# Patient Record
Sex: FEMALE | Race: WHITE | NOT HISPANIC OR LATINO
[De-identification: names, ages, dates, MRNs, and addresses within clinical notes are randomized per-mention and may not be internally consistent; named-entity substitution may affect disease eponyms.]

---

## 2017-07-21 ENCOUNTER — RX RENEWAL (OUTPATIENT)
Age: 52
End: 2017-07-21

## 2017-08-10 ENCOUNTER — APPOINTMENT (OUTPATIENT)
Dept: NEUROLOGY | Facility: CLINIC | Age: 52
End: 2017-08-10
Payer: COMMERCIAL

## 2017-08-10 VITALS
WEIGHT: 138 LBS | DIASTOLIC BLOOD PRESSURE: 60 MMHG | HEIGHT: 63 IN | OXYGEN SATURATION: 98 % | SYSTOLIC BLOOD PRESSURE: 102 MMHG | HEART RATE: 73 BPM | BODY MASS INDEX: 24.45 KG/M2

## 2017-08-10 DIAGNOSIS — G40.802 OTHER EPILEPSY, NOT INTRACTABLE, W/OUT STATUS EPILEPTICUS: ICD-10-CM

## 2017-08-10 PROCEDURE — 95816 EEG AWAKE AND DROWSY: CPT

## 2017-08-10 PROCEDURE — 99214 OFFICE O/P EST MOD 30 MIN: CPT

## 2017-08-12 ENCOUNTER — TRANSCRIPTION ENCOUNTER (OUTPATIENT)
Age: 52
End: 2017-08-12

## 2017-11-13 ENCOUNTER — APPOINTMENT (OUTPATIENT)
Dept: OPHTHALMOLOGY | Facility: CLINIC | Age: 52
End: 2017-11-13
Payer: COMMERCIAL

## 2017-11-13 ENCOUNTER — APPOINTMENT (OUTPATIENT)
Dept: OPHTHALMOLOGY | Facility: CLINIC | Age: 52
End: 2017-11-13

## 2017-11-13 DIAGNOSIS — H35.453 SECONDARY PIGMENTARY DEGENERATION, BILATERAL: ICD-10-CM

## 2017-11-13 PROCEDURE — 92014 COMPRE OPH EXAM EST PT 1/>: CPT

## 2017-11-13 RX ORDER — OLOPATADINE HYDROCHLORIDE 2 MG/ML
0.2 SOLUTION OPHTHALMIC DAILY
Qty: 1 | Refills: 11 | Status: ACTIVE | COMMUNITY
Start: 2017-11-13 | End: 1900-01-01

## 2018-02-13 ENCOUNTER — APPOINTMENT (OUTPATIENT)
Dept: NEUROLOGY | Facility: CLINIC | Age: 53
End: 2018-02-13
Payer: COMMERCIAL

## 2018-02-13 VITALS
BODY MASS INDEX: 24.8 KG/M2 | DIASTOLIC BLOOD PRESSURE: 68 MMHG | OXYGEN SATURATION: 99 % | SYSTOLIC BLOOD PRESSURE: 110 MMHG | HEIGHT: 63 IN | WEIGHT: 140 LBS | HEART RATE: 69 BPM | TEMPERATURE: 97.7 F

## 2018-02-13 DIAGNOSIS — G43.909 MIGRAINE, UNSPECIFIED, NOT INTRACTABLE, W/OUT STATUS MIGRAINOSUS: ICD-10-CM

## 2018-02-13 PROCEDURE — 99214 OFFICE O/P EST MOD 30 MIN: CPT

## 2018-02-13 RX ORDER — NAPROXEN 500 MG/1
500 TABLET ORAL
Refills: 0 | Status: ACTIVE | COMMUNITY

## 2018-03-21 ENCOUNTER — RX RENEWAL (OUTPATIENT)
Age: 53
End: 2018-03-21

## 2018-04-03 ENCOUNTER — RX RENEWAL (OUTPATIENT)
Age: 53
End: 2018-04-03

## 2018-11-12 ENCOUNTER — APPOINTMENT (OUTPATIENT)
Dept: OPHTHALMOLOGY | Facility: CLINIC | Age: 53
End: 2018-11-12
Payer: COMMERCIAL

## 2018-11-12 DIAGNOSIS — H10.13 ACUTE ATOPIC CONJUNCTIVITIS, BILATERAL: ICD-10-CM

## 2018-11-12 DIAGNOSIS — H04.123 DRY EYE SYNDROME OF BILATERAL LACRIMAL GLANDS: ICD-10-CM

## 2018-11-12 PROCEDURE — 92014 COMPRE OPH EXAM EST PT 1/>: CPT

## 2018-11-12 RX ORDER — OLOPATADINE HYDROCHLORIDE 2 MG/ML
0.2 SOLUTION OPHTHALMIC DAILY
Qty: 1 | Refills: 5 | Status: ACTIVE | COMMUNITY
Start: 2018-11-12 | End: 1900-01-01

## 2018-11-15 ENCOUNTER — MEDICATION RENEWAL (OUTPATIENT)
Age: 53
End: 2018-11-15

## 2018-11-15 RX ORDER — OLOPATADINE HCL 1 MG/ML
0.1 SOLUTION/ DROPS OPHTHALMIC DAILY
Qty: 1 | Refills: 4 | Status: ACTIVE | COMMUNITY
Start: 2018-11-15 | End: 1900-01-01

## 2019-04-08 ENCOUNTER — RX RENEWAL (OUTPATIENT)
Age: 54
End: 2019-04-08

## 2019-05-07 ENCOUNTER — RX RENEWAL (OUTPATIENT)
Age: 54
End: 2019-05-07

## 2019-09-24 ENCOUNTER — TRANSCRIPTION ENCOUNTER (OUTPATIENT)
Age: 54
End: 2019-09-24

## 2019-11-01 ENCOUNTER — TRANSCRIPTION ENCOUNTER (OUTPATIENT)
Age: 54
End: 2019-11-01

## 2019-11-01 ENCOUNTER — RX RENEWAL (OUTPATIENT)
Age: 54
End: 2019-11-01

## 2019-11-25 ENCOUNTER — APPOINTMENT (OUTPATIENT)
Dept: OPHTHALMOLOGY | Facility: CLINIC | Age: 54
End: 2019-11-25
Payer: COMMERCIAL

## 2019-11-25 ENCOUNTER — NON-APPOINTMENT (OUTPATIENT)
Age: 54
End: 2019-11-25

## 2019-11-25 PROCEDURE — 92014 COMPRE OPH EXAM EST PT 1/>: CPT

## 2020-01-02 ENCOUNTER — TRANSCRIPTION ENCOUNTER (OUTPATIENT)
Age: 55
End: 2020-01-02

## 2020-01-02 ENCOUNTER — RX RENEWAL (OUTPATIENT)
Age: 55
End: 2020-01-02

## 2020-01-07 ENCOUNTER — TRANSCRIPTION ENCOUNTER (OUTPATIENT)
Age: 55
End: 2020-01-07

## 2020-01-21 ENCOUNTER — APPOINTMENT (OUTPATIENT)
Dept: NEUROLOGY | Facility: CLINIC | Age: 55
End: 2020-01-21
Payer: COMMERCIAL

## 2020-01-21 VITALS
HEIGHT: 63 IN | OXYGEN SATURATION: 98 % | BODY MASS INDEX: 26.05 KG/M2 | DIASTOLIC BLOOD PRESSURE: 68 MMHG | HEART RATE: 74 BPM | SYSTOLIC BLOOD PRESSURE: 113 MMHG | WEIGHT: 147 LBS | TEMPERATURE: 98.1 F

## 2020-01-21 PROCEDURE — 99214 OFFICE O/P EST MOD 30 MIN: CPT

## 2020-01-21 NOTE — PHYSICAL EXAM
[General Appearance - Alert] : alert [General Appearance - In No Acute Distress] : in no acute distress [Oriented To Time, Place, And Person] : oriented to person, place, and time [Impaired Insight] : insight and judgment were intact [Affect] : the affect was normal [Person] : oriented to person [Place] : oriented to place [Time] : oriented to time [Visual Intact] : visual attention was ~T not ~L decreased [Concentration Intact] : normal concentrating ability [Repeating Phrases] : no difficulty repeating a phrase [Writing A Sentence] : no difficulty writing a sentence [Naming Objects] : no difficulty naming common objects [Reading] : reading intact [Comprehension] : comprehension intact [Fluency] : fluency intact [Cranial Nerves Optic (II)] : visual acuity intact bilaterally,  visual fields full to confrontation, pupils equal round and reactive to light [Past History] : adequate knowledge of personal past history [Cranial Nerves Facial (VII)] : face symmetrical [Cranial Nerves Trigeminal (V)] : facial sensation intact symmetrically [Cranial Nerves Oculomotor (III)] : extraocular motion intact [Cranial Nerves Vestibulocochlear (VIII)] : hearing was intact bilaterally [Cranial Nerves Accessory (XI - Cranial And Spinal)] : head turning and shoulder shrug symmetric [Cranial Nerves Glossopharyngeal (IX)] : tongue and palate midline [Motor Strength] : muscle strength was normal in all four extremities [Motor Tone] : muscle tone was normal in all four extremities [Cranial Nerves Hypoglossal (XII)] : there was no tongue deviation with protrusion [Sensation Tactile Decrease] : light touch was intact [No Muscle Atrophy] : normal bulk in all four extremities [Abnormal Walk] : normal gait [Balance] : balance was intact [2+] : Ankle jerk left 2+ [Past-pointing] : there was no past-pointing [Tremor] : no tremor present [Plantar Reflex Right Only] : normal on the right [Plantar Reflex Left Only] : normal on the left

## 2020-01-21 NOTE — HISTORY OF PRESENT ILLNESS
[FreeTextEntry1] : Ms. CONN presents today for a follow up visit of Epilepsy. First GTC was in her 20's, REEG from 08/2017 was stable.\par \par At this time she denies any seizure, seizure-like activity, LOC, lapses in time, tongue biting, staring spells or urinary incontinence. Remains compliant with Keppra and TPX without known side effects.\par \par Of note, TPX was started for migraines. Have been stable for many years and typically occur with changes in the weather or stress. \par \par She denies any new neurological complaints at this time.

## 2020-01-21 NOTE — DISCUSSION/SUMMARY
[Well-controlled] : well-controlled [Safety Recommendations] : The patient was advised in regards to the risk of seizures and general seizure safety recommendations including not to be bathing alone, climbing to high places and operating heavy machinery. [Compliance with Medications] : The importance of compliance with medications was reinforced. [Medication Side Effects] : High frequency and serious potential medication adverse effects were reviewed with the patient, including but not exclusive to psychiatric effects.  Information sheets on medication side effects were made available to the patient in our clinic.  The patient or advocate agrees to notify us for any concerns. [Sleep Hygiene/Sleep Disruption Risks] : Sleep hygiene and the risks of sleep disruption were discussed. [FreeTextEntry1] : Patient with Epilepsy and Migraines.\par \par Doing very well at this time. Remains seizure free, migraines occur with triggers such as weather changes or stress. At this time we recommend the following: \par \par 1. Continue Keppra 750mg BID\par 2. Continue TPX 50mg daily \par 3. RTC yearly if stable.

## 2020-03-09 ENCOUNTER — TRANSCRIPTION ENCOUNTER (OUTPATIENT)
Age: 55
End: 2020-03-09

## 2020-03-10 ENCOUNTER — TRANSCRIPTION ENCOUNTER (OUTPATIENT)
Age: 55
End: 2020-03-10

## 2020-07-20 ENCOUNTER — APPOINTMENT (OUTPATIENT)
Dept: NEUROLOGY | Facility: CLINIC | Age: 55
End: 2020-07-20

## 2020-11-23 ENCOUNTER — NON-APPOINTMENT (OUTPATIENT)
Age: 55
End: 2020-11-23

## 2020-11-23 ENCOUNTER — APPOINTMENT (OUTPATIENT)
Dept: OPHTHALMOLOGY | Facility: CLINIC | Age: 55
End: 2020-11-23
Payer: COMMERCIAL

## 2020-11-23 PROCEDURE — 92014 COMPRE OPH EXAM EST PT 1/>: CPT

## 2021-01-25 ENCOUNTER — APPOINTMENT (OUTPATIENT)
Dept: NEUROLOGY | Facility: CLINIC | Age: 56
End: 2021-01-25
Payer: COMMERCIAL

## 2021-01-25 DIAGNOSIS — G40.909 EPILEPSY, UNSPECIFIED, NOT INTRACTABLE, W/OUT STATUS EPILEPTICUS: ICD-10-CM

## 2021-01-25 PROCEDURE — 99212 OFFICE O/P EST SF 10 MIN: CPT | Mod: 95

## 2021-01-25 NOTE — HISTORY OF PRESENT ILLNESS
[FreeTextEntry1] : Ms. CONN presents today for a follow up visit of Epilepsy. First GTC was in her 20's.\par \par At this time she denies any seizure, seizure-like activity, LOC, lapses in time, tongue biting, staring spells or urinary incontinence. Remains compliant with Keppra and TPX without known side effects.\par \par Of note, TPX was started for migraines. Have been stable for many years and typically occur with changes in the weather or stress. \par \par She denies any new neurological complaints at this time.

## 2021-01-25 NOTE — REASON FOR VISIT
[Home] : at home, [unfilled] , at the time of the visit. [place] [place] : [place] the medical office located in  [Verbal consent obtained from patient] : the patient, [unfilled]

## 2021-01-25 NOTE — DISCUSSION/SUMMARY
[FreeTextEntry1] : Patient with Epilepsy and Migraines.\par \par Doing very well at this time. Remains seizure free, migraines occur with triggers such as weather changes or stress. At this time we recommend the following: \par \par 1. Continue Keppra 750mg BID\par 2. Continue TPX 50mg daily \par 3. RTC yearly if stable. \par \par All questions and concerns were addressed. Emotional support was rendered.

## 2021-01-26 ENCOUNTER — APPOINTMENT (OUTPATIENT)
Dept: NEUROLOGY | Facility: CLINIC | Age: 56
End: 2021-01-26

## 2022-01-19 ENCOUNTER — APPOINTMENT (OUTPATIENT)
Dept: OPHTHALMOLOGY | Facility: CLINIC | Age: 57
End: 2022-01-19
Payer: COMMERCIAL

## 2022-01-19 ENCOUNTER — NON-APPOINTMENT (OUTPATIENT)
Age: 57
End: 2022-01-19

## 2022-01-19 PROCEDURE — 92014 COMPRE OPH EXAM EST PT 1/>: CPT

## 2023-07-12 ENCOUNTER — APPOINTMENT (OUTPATIENT)
Dept: OPHTHALMOLOGY | Facility: CLINIC | Age: 58
End: 2023-07-12
Payer: COMMERCIAL

## 2023-07-12 ENCOUNTER — NON-APPOINTMENT (OUTPATIENT)
Age: 58
End: 2023-07-12

## 2023-07-12 PROCEDURE — 92012 INTRM OPH EXAM EST PATIENT: CPT

## 2023-08-30 ENCOUNTER — APPOINTMENT (OUTPATIENT)
Dept: OPHTHALMOLOGY | Facility: CLINIC | Age: 58
End: 2023-08-30

## 2023-09-06 ENCOUNTER — NON-APPOINTMENT (OUTPATIENT)
Age: 58
End: 2023-09-06

## 2023-09-06 ENCOUNTER — APPOINTMENT (OUTPATIENT)
Dept: OPHTHALMOLOGY | Facility: CLINIC | Age: 58
End: 2023-09-06
Payer: COMMERCIAL

## 2023-09-06 PROCEDURE — 92012 INTRM OPH EXAM EST PATIENT: CPT

## 2024-01-03 ENCOUNTER — APPOINTMENT (OUTPATIENT)
Dept: OPHTHALMOLOGY | Facility: CLINIC | Age: 59
End: 2024-01-03

## 2024-10-02 ENCOUNTER — APPOINTMENT (OUTPATIENT)
Dept: OPHTHALMOLOGY | Facility: CLINIC | Age: 59
End: 2024-10-02
Payer: COMMERCIAL

## 2024-10-02 ENCOUNTER — NON-APPOINTMENT (OUTPATIENT)
Age: 59
End: 2024-10-02

## 2024-10-02 PROCEDURE — 92012 INTRM OPH EXAM EST PATIENT: CPT

## 2024-10-02 PROCEDURE — 92250 FUNDUS PHOTOGRAPHY W/I&R: CPT

## 2025-03-14 NOTE — END OF VISIT
Previously Declined (within the last year)
[Time Spent: ___ minutes] : I have spent [unfilled] minutes of time on the encounter.